# Patient Record
Sex: FEMALE | Race: WHITE | NOT HISPANIC OR LATINO | URBAN - METROPOLITAN AREA
[De-identification: names, ages, dates, MRNs, and addresses within clinical notes are randomized per-mention and may not be internally consistent; named-entity substitution may affect disease eponyms.]

---

## 2019-06-06 ENCOUNTER — OFFICE VISIT (OUTPATIENT)
Dept: URBAN - METROPOLITAN AREA CLINIC 23 | Facility: CLINIC | Age: 34
End: 2019-06-06
Payer: COMMERCIAL

## 2019-06-06 PROCEDURE — 92310 CONTACT LENS FITTING OU: CPT | Performed by: OPTOMETRIST

## 2019-06-06 PROCEDURE — 92012 INTRM OPH EXAM EST PATIENT: CPT | Performed by: OPTOMETRIST

## 2019-06-06 ASSESSMENT — VISUAL ACUITY
OD: 20/20
OS: 20/200

## 2019-06-06 ASSESSMENT — KERATOMETRY
OD: 45.13
OS: 44.25

## 2019-06-06 ASSESSMENT — INTRAOCULAR PRESSURE
OS: 14
OD: 14

## 2019-06-06 NOTE — IMPRESSION/PLAN
Impression: Hypermetropia, bilateral: H52.03. Plan: New glasses Rx was given today. Patient also given update CLs today. Advised patient of condition.

## 2020-11-02 ENCOUNTER — OFFICE VISIT (OUTPATIENT)
Dept: URBAN - METROPOLITAN AREA CLINIC 23 | Facility: CLINIC | Age: 35
End: 2020-11-02
Payer: COMMERCIAL

## 2020-11-02 PROCEDURE — 92310 CONTACT LENS FITTING OU: CPT | Performed by: OPTOMETRIST

## 2020-11-02 PROCEDURE — 92012 INTRM OPH EXAM EST PATIENT: CPT | Performed by: OPTOMETRIST

## 2020-11-02 ASSESSMENT — INTRAOCULAR PRESSURE
OS: 10
OD: 10

## 2020-11-02 ASSESSMENT — KERATOMETRY
OD: 44.75
OS: 44.25

## 2022-02-04 ENCOUNTER — REFRACTIVE (OUTPATIENT)
Dept: URBAN - METROPOLITAN AREA CLINIC 33 | Facility: CLINIC | Age: 37
End: 2022-02-04

## 2022-02-04 DIAGNOSIS — H52.01 HYPERMETROPIA, RIGHT EYE: Primary | ICD-10-CM

## 2022-02-04 DIAGNOSIS — H52.02 HYPERMETROPIA, LEFT EYE: ICD-10-CM

## 2022-02-04 ASSESSMENT — VISUAL ACUITY
OD: 20/20
OS: 20/100

## 2022-02-04 ASSESSMENT — KERATOMETRY
OD: 44.88
OS: 44.00

## 2022-02-04 NOTE — IMPRESSION/PLAN
Impression: Hypermetropia, right eye: H52.01. Plan: Discussed diagnosis in detail with patient. Discussed treatment options with patient. Discussed risks and benefits and patient understands.  OD first, PRN for OS

## 2022-02-28 ENCOUNTER — TESTING ONLY (OUTPATIENT)
Dept: URBAN - METROPOLITAN AREA CLINIC 23 | Facility: CLINIC | Age: 37
End: 2022-02-28
Payer: COMMERCIAL

## 2022-02-28 DIAGNOSIS — H52.03 HYPERMETROPIA, BILATERAL: Primary | ICD-10-CM

## 2022-02-28 ASSESSMENT — PACHYMETRY
OS: 2.89
OS: 20.38
OD: 2.80
OD: 21.08

## 2022-03-14 ENCOUNTER — SURGERY (OUTPATIENT)
Dept: URBAN - METROPOLITAN AREA SURGERY 15 | Facility: SURGERY | Age: 37
End: 2022-03-14

## 2022-03-15 ENCOUNTER — POST-OPERATIVE VISIT (OUTPATIENT)
Dept: URBAN - METROPOLITAN AREA CLINIC 23 | Facility: CLINIC | Age: 37
End: 2022-03-15

## 2022-03-15 ASSESSMENT — INTRAOCULAR PRESSURE
OS: 15
OD: 9

## 2022-03-15 NOTE — IMPRESSION/PLAN
Impression: S/P Cataract Extraction by phacoemulsification with IOL placement; ORA; LENSX OD - 1 Day. Encounter for surgical aftercare following surgery on a sense organ  Z48.810. Post operative instructions reviewed - Plan: Discussed outcome of procedure with patient. Reassured patient of current treatment. --Advised patient to use artificial tears for comfort. --Continue Ofloxacin 0.3%--Taper Prednisolone acetate 1% QID x 2 wk, TID x 1 wk, BID x 1wk, QD x 1wk, then d/c

## 2022-03-23 ENCOUNTER — POST-OPERATIVE VISIT (OUTPATIENT)
Dept: URBAN - METROPOLITAN AREA CLINIC 23 | Facility: CLINIC | Age: 37
End: 2022-03-23

## 2022-03-23 DIAGNOSIS — Z48.810 ENCOUNTER FOR SURGICAL AFTERCARE FOLLOWING SURGERY ON A SENSE ORGAN: Primary | ICD-10-CM

## 2022-03-23 PROCEDURE — 99024 POSTOP FOLLOW-UP VISIT: CPT | Performed by: OPHTHALMOLOGY

## 2022-03-23 ASSESSMENT — INTRAOCULAR PRESSURE
OD: 10
OS: 14

## 2022-03-23 NOTE — IMPRESSION/PLAN
Impression:  Encounter for surgical aftercare following surgery on a sense organ  Z48.810. Excellent post op course   Post operative instructions reviewed - Condition is improving - Plan: D/C Ofloxacin and Taper PF as directed. --Advised patient to use artificial tears for comfort.

## 2022-05-04 ENCOUNTER — POST-OPERATIVE VISIT (OUTPATIENT)
Dept: URBAN - METROPOLITAN AREA CLINIC 23 | Facility: CLINIC | Age: 37
End: 2022-05-04
Payer: COMMERCIAL

## 2022-05-04 DIAGNOSIS — H26.491 OTHER SECONDARY CATARACT, RIGHT EYE: ICD-10-CM

## 2022-05-04 DIAGNOSIS — Z48.810 ENCOUNTER FOR SURGICAL AFTERCARE FOLLOWING SURGERY ON A SENSE ORGAN: Primary | ICD-10-CM

## 2022-05-04 ASSESSMENT — INTRAOCULAR PRESSURE
OD: 10
OS: 10

## 2022-05-04 NOTE — IMPRESSION/PLAN
Impression: S/P Cataract Extraction by phacoemulsification with IOL placement; ORA; LENSX OD - 51 Days. Encounter for surgical aftercare following surgery on a sense organ  Z48.810. Excellent post op course   Post operative instructions reviewed - Condition is improving - Cataract OD. Plan: Recommend YAG OD. R/B/A discussed and understood by patient and patient elects to proceed as indicated OD. --Advised patient to use artificial tears for comfort.

## 2022-05-04 NOTE — IMPRESSION/PLAN
Impression: Other secondary cataract, right eye: H26.491. Condition: quality of life issue. Symptoms: could improve with surgery. Vision: vision affected. Plan: Discussed diagnosis. Discussed treatment options. Recommend YAG PC OD. R/B/A discussed and understood by patient and patient elects to proceed with YAG OD as recommended.  Rl-2

## 2022-08-16 ENCOUNTER — POST-OPERATIVE VISIT (OUTPATIENT)
Dept: URBAN - METROPOLITAN AREA CLINIC 23 | Facility: CLINIC | Age: 37
End: 2022-08-16
Payer: COMMERCIAL

## 2022-08-16 DIAGNOSIS — Z48.810 ENCOUNTER FOR SURGICAL AFTERCARE FOLLOWING SURGERY ON A SENSE ORGAN: Primary | ICD-10-CM

## 2022-08-16 ASSESSMENT — INTRAOCULAR PRESSURE
OD: 11
OS: 11

## 2022-08-16 NOTE — IMPRESSION/PLAN
Impression: S/P Cataract Extraction by phacoemulsification with IOL placement; ORA; LENSX OD - 155 Days. Encounter for surgical aftercare following surgery on a sense organ  Z48.810. Excellent post op course   Post operative instructions reviewed - Condition is improving - Plan: Discussed anisometropia with patient. Discussed treatment options. Recommend refraction and updated glasses prescription at patient convenience. --Advised patient to use artificial tears for comfort.

## 2022-08-24 ENCOUNTER — OFFICE VISIT (OUTPATIENT)
Dept: URBAN - METROPOLITAN AREA CLINIC 22 | Facility: CLINIC | Age: 37
End: 2022-08-24
Payer: COMMERCIAL

## 2022-08-24 DIAGNOSIS — H52.03 HYPERMETROPIA, BILATERAL: Primary | ICD-10-CM

## 2022-08-24 PROCEDURE — 92310 CONTACT LENS FITTING OU: CPT | Performed by: STUDENT IN AN ORGANIZED HEALTH CARE EDUCATION/TRAINING PROGRAM

## 2022-08-24 ASSESSMENT — KERATOMETRY
OS: 44.13
OD: 45.38

## 2022-08-24 ASSESSMENT — VISUAL ACUITY
OS: 20/100
OD: 20/20

## 2022-08-24 ASSESSMENT — INTRAOCULAR PRESSURE
OS: 13
OD: 11

## 2022-08-24 NOTE — IMPRESSION/PLAN
Impression: Hypermetropia, bilateral: H52.03. Plan: Discussed findings. Patient reports much improved balance w/ CL Rx today. New contact lens Rx finalized and given to patient.

## 2022-09-06 ENCOUNTER — OFFICE VISIT (OUTPATIENT)
Dept: URBAN - METROPOLITAN AREA CLINIC 22 | Facility: CLINIC | Age: 37
End: 2022-09-06

## 2022-09-06 DIAGNOSIS — H52.03 HYPERMETROPIA, BILATERAL: Primary | ICD-10-CM

## 2022-09-06 PROCEDURE — 92310 CONTACT LENS FITTING OU: CPT | Performed by: STUDENT IN AN ORGANIZED HEALTH CARE EDUCATION/TRAINING PROGRAM

## 2022-09-06 NOTE — IMPRESSION/PLAN
Impression: Hypermetropia, bilateral: H52.03. Plan: Any change in Rx today gave pt discomfort. Keep current CL Rx. OK to wear CL OS only for distance.

## 2022-10-07 ENCOUNTER — OFFICE VISIT (OUTPATIENT)
Dept: URBAN - METROPOLITAN AREA CLINIC 37 | Facility: CLINIC | Age: 37
End: 2022-10-07
Payer: COMMERCIAL

## 2022-10-07 DIAGNOSIS — Z96.1 PRESENCE OF INTRAOCULAR LENS: Primary | ICD-10-CM

## 2022-10-07 PROCEDURE — 99204 OFFICE O/P NEW MOD 45 MIN: CPT | Performed by: OPHTHALMOLOGY

## 2022-10-07 ASSESSMENT — VISUAL ACUITY: OD: 20/20

## 2022-10-07 NOTE — IMPRESSION/PLAN
Impression: Presence of intraocular lens: Z96.1. Plan: Well positioned PC IOL(s). Per patient has been using a +3.00 in her OS. Discussed with patient recommend trying to determine compatibility and trail and error for OS. Discussed trailing contact with full correction in OS to determine full correction vs partial correction. Patient to return to Dr. Jefe Sharp for recommendation on contact lens trail Trailed a +0.50 with OD and patient reports improvement. Patient educated on findings,  Discussed diagnosis in detail with patient. Discussed treatment options with patient. Discussed risks and benefits and patient understands. Discussed risks of progression. Procedure Explained and understood by patient. Lasik enhancement OD. Understands this may limit vision after surgery.

## 2022-10-10 ENCOUNTER — OFFICE VISIT (OUTPATIENT)
Dept: URBAN - METROPOLITAN AREA CLINIC 23 | Facility: CLINIC | Age: 37
End: 2022-10-10
Payer: COMMERCIAL

## 2022-10-10 DIAGNOSIS — H52.31 ANISOMETROPIA: Primary | ICD-10-CM

## 2022-10-10 RX ORDER — OFLOXACIN 3 MG/ML
0.3 % SOLUTION/ DROPS OPHTHALMIC
Qty: 5 | Refills: 1 | Status: ACTIVE
Start: 2022-10-10

## 2022-10-10 RX ORDER — PREDNISOLONE ACETATE 10 MG/ML
1 % SUSPENSION/ DROPS OPHTHALMIC
Qty: 10 | Refills: 1 | Status: ACTIVE
Start: 2022-10-10

## 2022-10-10 ASSESSMENT — INTRAOCULAR PRESSURE
OS: 12
OD: 12

## 2022-10-10 ASSESSMENT — KERATOMETRY
OS: 43.88
OD: 45.00

## 2022-10-10 NOTE — IMPRESSION/PLAN
Impression: Anisometropia: H52.31. Condition: quality of life issue. Symptoms: could improve with surgery. Vision: vision affected. Pt symptoms not resolved with contact lenses Plan: Discussed diagnosis in detail with patient. Discussed treatment options with patient. Surgical treatment is required. Surgical risks and benefits were discussed, explained and understood by patient. Patient will consider surgery. RL-2 Recommend Trifocal IOL aim plano OS.

## 2022-11-01 ENCOUNTER — TESTING ONLY (OUTPATIENT)
Dept: URBAN - METROPOLITAN AREA CLINIC 23 | Facility: CLINIC | Age: 37
End: 2022-11-01
Payer: COMMERCIAL

## 2022-11-01 DIAGNOSIS — H52.31 ANISOMETROPIA: Primary | ICD-10-CM

## 2022-11-01 ASSESSMENT — PACHYMETRY
OD: -
OD: 21.09
OS: 2.97
OS: 20.39

## 2022-11-11 ENCOUNTER — POST-OPERATIVE VISIT (OUTPATIENT)
Dept: URBAN - METROPOLITAN AREA CLINIC 23 | Facility: CLINIC | Age: 37
End: 2022-11-11
Payer: COMMERCIAL

## 2022-11-11 DIAGNOSIS — Z48.810 ENCOUNTER FOR SURGICAL AFTERCARE FOLLOWING SURGERY ON A SENSE ORGAN: Primary | ICD-10-CM

## 2022-11-11 PROCEDURE — 99024 POSTOP FOLLOW-UP VISIT: CPT | Performed by: OPTOMETRIST

## 2022-11-11 ASSESSMENT — INTRAOCULAR PRESSURE
OS: 10
OD: 15

## 2022-11-11 NOTE — IMPRESSION/PLAN
Impression: S/P Lensectomy; ORA OS - 1 Day. Encounter for surgical aftercare following surgery on a sense organ  Z48.810. Plan: Pt edu. Appropriate appearance and IOP. Use AT's PRN OU for comfort. Start Pred Acetate QID OS and Ofloxacin QID OS. Advised patient to call with any issues. RTC as scheduled.

## 2022-11-17 ENCOUNTER — POST-OPERATIVE VISIT (OUTPATIENT)
Dept: URBAN - METROPOLITAN AREA CLINIC 23 | Facility: CLINIC | Age: 37
End: 2022-11-17
Payer: COMMERCIAL

## 2022-11-17 DIAGNOSIS — Z48.810 ENCOUNTER FOR SURGICAL AFTERCARE FOLLOWING SURGERY ON A SENSE ORGAN: Primary | ICD-10-CM

## 2022-11-17 PROCEDURE — 99024 POSTOP FOLLOW-UP VISIT: CPT | Performed by: OPTOMETRIST

## 2022-11-17 ASSESSMENT — INTRAOCULAR PRESSURE
OS: 10
OD: 10

## 2022-11-17 NOTE — IMPRESSION/PLAN
Impression: S/P Lensectomy; ORA OS - 7 Days. Encounter for surgical aftercare following surgery on a sense organ  Z48.810. Excellent post op course   Post operative instructions reviewed - Plan: Pt edu. Appropriate appearance and IOP. Use AT's PRN OU for comfort. Advised patient to call with any issues. RTC 3-4 weeks for PO3. --Advised patient to use artificial tears for comfort.

## 2022-12-20 ENCOUNTER — POST-OPERATIVE VISIT (OUTPATIENT)
Dept: URBAN - METROPOLITAN AREA CLINIC 23 | Facility: CLINIC | Age: 37
End: 2022-12-20
Payer: COMMERCIAL

## 2022-12-20 DIAGNOSIS — Z48.810 ENCOUNTER FOR SURGICAL AFTERCARE FOLLOWING SURGERY ON A SENSE ORGAN: Primary | ICD-10-CM

## 2022-12-20 ASSESSMENT — INTRAOCULAR PRESSURE
OD: 14
OS: 10

## 2022-12-20 NOTE — IMPRESSION/PLAN
Impression: S/P Lensectomy; ORA OS - 40 Days. Encounter for surgical aftercare following surgery on a sense organ  Z48.810. Plan: Pt edu. Appropriate appearance and IOP. Use AT's PRN OU for comfort. New srx was given today. Advised patient to call with any issues. RTC 1 year DFE.

## 2023-10-26 ENCOUNTER — OFFICE VISIT (OUTPATIENT)
Dept: URBAN - METROPOLITAN AREA CLINIC 23 | Facility: CLINIC | Age: 38
End: 2023-10-26
Payer: COMMERCIAL

## 2023-10-26 DIAGNOSIS — H26.492 OTHER SECONDARY CATARACT, LEFT EYE: ICD-10-CM

## 2023-10-26 DIAGNOSIS — E23.7 DISORDER OF PITUITARY GLAND, UNSPECIFIED: Primary | ICD-10-CM

## 2023-10-26 PROCEDURE — 99214 OFFICE O/P EST MOD 30 MIN: CPT | Performed by: OPTOMETRIST

## 2023-10-26 ASSESSMENT — VISUAL ACUITY
OS: 20/150
OD: 20/40

## 2023-10-26 ASSESSMENT — INTRAOCULAR PRESSURE
OD: 15
OS: 15

## 2023-10-30 ENCOUNTER — TECH ONLY (OUTPATIENT)
Dept: URBAN - METROPOLITAN AREA CLINIC 23 | Facility: CLINIC | Age: 38
End: 2023-10-30
Payer: COMMERCIAL

## 2023-10-30 DIAGNOSIS — E23.7 DISORDER OF PITUITARY GLAND, UNSPECIFIED: Primary | ICD-10-CM

## 2023-10-30 PROCEDURE — 92083 EXTENDED VISUAL FIELD XM: CPT | Performed by: OPTOMETRIST

## 2023-11-07 ENCOUNTER — OFFICE VISIT (OUTPATIENT)
Dept: URBAN - METROPOLITAN AREA CLINIC 23 | Facility: CLINIC | Age: 38
End: 2023-11-07
Payer: COMMERCIAL

## 2023-11-07 DIAGNOSIS — E23.7 DISORDER OF PITUITARY GLAND, UNSPECIFIED: Primary | ICD-10-CM

## 2023-11-07 PROCEDURE — 99212 OFFICE O/P EST SF 10 MIN: CPT | Performed by: OPTOMETRIST

## 2023-11-07 ASSESSMENT — VISUAL ACUITY
OD: 20/25
OS: 20/100

## 2024-11-11 ENCOUNTER — OFFICE VISIT (OUTPATIENT)
Dept: URBAN - METROPOLITAN AREA CLINIC 23 | Facility: CLINIC | Age: 39
End: 2024-11-11
Payer: COMMERCIAL

## 2024-11-11 DIAGNOSIS — H26.492 OTHER SECONDARY CATARACT, LEFT EYE: ICD-10-CM

## 2024-11-11 DIAGNOSIS — E23.7 DISORDER OF PITUITARY GLAND, UNSPECIFIED: Primary | ICD-10-CM

## 2024-11-11 PROCEDURE — 92083 EXTENDED VISUAL FIELD XM: CPT | Performed by: OPTOMETRIST

## 2024-11-11 PROCEDURE — 99213 OFFICE O/P EST LOW 20 MIN: CPT | Performed by: OPTOMETRIST

## 2024-11-11 ASSESSMENT — KERATOMETRY
OD: 44.63
OS: 44.38

## 2024-11-11 ASSESSMENT — INTRAOCULAR PRESSURE
OS: 14
OD: 14